# Patient Record
Sex: FEMALE | Race: ASIAN | NOT HISPANIC OR LATINO
[De-identification: names, ages, dates, MRNs, and addresses within clinical notes are randomized per-mention and may not be internally consistent; named-entity substitution may affect disease eponyms.]

---

## 2020-04-04 ENCOUNTER — APPOINTMENT (OUTPATIENT)
Dept: DISASTER EMERGENCY | Facility: CLINIC | Age: 35
End: 2020-04-04

## 2020-04-26 ENCOUNTER — MESSAGE (OUTPATIENT)
Age: 35
End: 2020-04-26

## 2020-05-06 LAB
SARS-COV-2 IGG SERPL IA-ACNC: <0.1 INDEX
SARS-COV-2 IGG SERPL QL IA: NEGATIVE

## 2020-05-12 PROBLEM — Z00.00 ENCOUNTER FOR PREVENTIVE HEALTH EXAMINATION: Status: ACTIVE | Noted: 2020-05-12

## 2020-09-10 ENCOUNTER — TRANSCRIPTION ENCOUNTER (OUTPATIENT)
Age: 35
End: 2020-09-10

## 2020-10-27 ENCOUNTER — RESULT REVIEW (OUTPATIENT)
Age: 35
End: 2020-10-27

## 2020-12-31 ENCOUNTER — ASOB RESULT (OUTPATIENT)
Age: 35
End: 2020-12-31

## 2020-12-31 ENCOUNTER — APPOINTMENT (OUTPATIENT)
Dept: ANTEPARTUM | Facility: CLINIC | Age: 35
End: 2020-12-31
Payer: COMMERCIAL

## 2020-12-31 PROCEDURE — 99072 ADDL SUPL MATRL&STAF TM PHE: CPT

## 2020-12-31 PROCEDURE — 76801 OB US < 14 WKS SINGLE FETUS: CPT

## 2021-02-10 ENCOUNTER — ASOB RESULT (OUTPATIENT)
Age: 36
End: 2021-02-10

## 2021-02-10 ENCOUNTER — APPOINTMENT (OUTPATIENT)
Dept: ANTEPARTUM | Facility: CLINIC | Age: 36
End: 2021-02-10
Payer: COMMERCIAL

## 2021-02-10 PROCEDURE — 99214 OFFICE O/P EST MOD 30 MIN: CPT | Mod: 25

## 2021-02-10 PROCEDURE — 76817 TRANSVAGINAL US OBSTETRIC: CPT

## 2021-02-10 PROCEDURE — 99072 ADDL SUPL MATRL&STAF TM PHE: CPT

## 2021-02-10 PROCEDURE — 76811 OB US DETAILED SNGL FETUS: CPT

## 2021-02-11 ENCOUNTER — APPOINTMENT (OUTPATIENT)
Dept: OBGYN | Facility: CLINIC | Age: 36
End: 2021-02-11
Payer: COMMERCIAL

## 2021-02-11 PROCEDURE — 76805 OB US >/= 14 WKS SNGL FETUS: CPT

## 2021-02-11 PROCEDURE — 59000 AMNIOCENTESIS DIAGNOSTIC: CPT

## 2021-02-11 PROCEDURE — 99072 ADDL SUPL MATRL&STAF TM PHE: CPT

## 2021-02-11 PROCEDURE — 76946 ECHO GUIDE FOR AMNIOCENTESIS: CPT

## 2021-02-16 ENCOUNTER — APPOINTMENT (OUTPATIENT)
Dept: MATERNAL FETAL MEDICINE | Facility: CLINIC | Age: 36
End: 2021-02-16

## 2021-04-21 ENCOUNTER — APPOINTMENT (OUTPATIENT)
Dept: ANTEPARTUM | Facility: CLINIC | Age: 36
End: 2021-04-21

## 2021-05-01 ENCOUNTER — OUTPATIENT (OUTPATIENT)
Dept: INPATIENT UNIT | Facility: HOSPITAL | Age: 36
LOS: 1 days | Discharge: HOME | End: 2021-05-01
Payer: COMMERCIAL

## 2021-05-01 VITALS — DIASTOLIC BLOOD PRESSURE: 65 MMHG | SYSTOLIC BLOOD PRESSURE: 105 MMHG | HEART RATE: 94 BPM

## 2021-05-01 VITALS
SYSTOLIC BLOOD PRESSURE: 116 MMHG | DIASTOLIC BLOOD PRESSURE: 70 MMHG | TEMPERATURE: 98 F | HEART RATE: 80 BPM | RESPIRATION RATE: 16 BRPM

## 2021-05-01 DIAGNOSIS — O26.899 OTHER SPECIFIED PREGNANCY RELATED CONDITIONS, UNSPECIFIED TRIMESTER: ICD-10-CM

## 2021-05-01 DIAGNOSIS — Z3A.00 WEEKS OF GESTATION OF PREGNANCY NOT SPECIFIED: ICD-10-CM

## 2021-05-01 LAB
APPEARANCE UR: CLEAR — SIGNIFICANT CHANGE UP
BACTERIA # UR AUTO: ABNORMAL
BILIRUB UR-MCNC: NEGATIVE — SIGNIFICANT CHANGE UP
COLOR SPEC: SIGNIFICANT CHANGE UP
DIFF PNL FLD: ABNORMAL
EPI CELLS # UR: SIGNIFICANT CHANGE UP
GLUCOSE UR QL: NEGATIVE — SIGNIFICANT CHANGE UP
KETONES UR-MCNC: ABNORMAL
LEUKOCYTE ESTERASE UR-ACNC: NEGATIVE — SIGNIFICANT CHANGE UP
NITRITE UR-MCNC: NEGATIVE — SIGNIFICANT CHANGE UP
PH UR: 7.5 — SIGNIFICANT CHANGE UP (ref 5–8)
PROT UR-MCNC: NEGATIVE — SIGNIFICANT CHANGE UP
RBC CASTS # UR COMP ASSIST: 0 /HPF — SIGNIFICANT CHANGE UP (ref 0–4)
SP GR SPEC: 1.01 — LOW (ref 1.01–1.02)
UROBILINOGEN FLD QL: SIGNIFICANT CHANGE UP
WBC UR QL: SIGNIFICANT CHANGE UP /HPF (ref 0–5)

## 2021-05-01 PROCEDURE — 76818 FETAL BIOPHYS PROFILE W/NST: CPT | Mod: 26

## 2021-05-01 PROCEDURE — 99214 OFFICE O/P EST MOD 30 MIN: CPT | Mod: 25

## 2021-05-01 PROCEDURE — 76817 TRANSVAGINAL US OBSTETRIC: CPT | Mod: 26

## 2021-05-01 NOTE — OB PROVIDER TRIAGE NOTE - NSOBPROVIDERNOTE_OBGYN_ALL_OB_FT
a/p: 35 y.o.  ANISHA 2021 @ 2021 @ 31.1 weeks     no evidence of acute process    recommendation per Dr Rivera: f/u with prenatal office on Monday 5/3/2021, pelvic rest  discussed with Dr Jim. Pt discharged home with  labor precautions, fetal kick counts reviewed. Pt instructed on strict pelvic rest.     Vannessa, NP

## 2021-05-01 NOTE — OB PROVIDER TRIAGE NOTE - NSHPPHYSICALEXAM_GEN_ALL_CORE
abdomen soft, nontender  taus: slup, cephalic presentation, posterior placenta, bpp 8/8, vinicio 16.7  sse: no active bleeding noted, 5 mL bright red blood cleared from vaginal vault, abdomen soft, nontender  taus: slup, cephalic presentation, posterior placenta, bpp 8/8, vinicio 16.7  sse: no active bleeding noted, 5 mL bright red blood cleared from vaginal vault, tissue noted to right posterior 8 o'clock on cervix, Dr Nelson called to bedside. Dr Rivera at bedside for speculum and repeat transvaginal exam: most likely cervical polyp per Dr Rivera  tvus: cervical length 3.5, no dynamic changes/funneling noted, no placenta previa noted  nst reactive   toco: ctx q 2-3 mins

## 2021-05-01 NOTE — OB PROVIDER TRIAGE NOTE - HISTORY OF PRESENT ILLNESS
35 y.o.  ANISHA 2021 @ 2021 @ 31.1 weeks presents from scheduled prenatal office for evaluation of contractions on nst and pt c/o vaginal bleeding. Pt denies ctx, LOF, current bleeding. States +FM. Antepartum course complicated by low lying placenta, large cervical fibroid.     allergies:  NKDA  medications:  prenatal vitamins    med/surg hx:  denies    OBGYN hx:  fibroid uterus:    TERESA suberosal  7.9 x 8.1 x 6.9  anterior subserosal 2.2 x 2.3 x 1.1  right lateral 7.8 x 6.2 x 5.5  posterior 4.1 x 4.0 3.0 35 y.o.  ANISHA 2021 @ 2021 @ 31.1 weeks presents from scheduled prenatal office for evaluation of contractions on nst and pt c/o vaginal bleeding. Pt denies ctx, LOF, current bleeding. States +FM. Antepartum course complicated by low lying placenta, large cervical fibroid.     Blood type: O positive  allergies:  NKDA  medications:  prenatal vitamins    med/surg hx:  denies    OBGYN hx:  fibroid uterus:    TERESA suberosal  7.9 x 8.1 x 6.9  anterior subserosal 2.2 x 2.3 x 1.1  right lateral 7.8 x 6.2 x 5.5  posterior 4.1 x 4.0 3.0 35 y.o.  ANISHA 2021 @ 31.1 weeks presents from scheduled prenatal office for evaluation of contractions on nst and pt c/o vaginal bleeding. Pt denies ctx, LOF, current bleeding. States +FM. Antepartum course complicated by low lying placenta, large cervical fibroid.     Blood type: O positive  allergies:  NKDA  medications:  prenatal vitamins    med/surg hx:  denies    OBGYN hx:  fibroid uterus:    TERESA suberosal  7.9 x 8.1 x 6.9  anterior subserosal 2.2 x 2.3 x 1.1  right lateral 7.8 x 6.2 x 5.5  posterior 4.1 x 4.0 3.0

## 2021-05-01 NOTE — OB PROVIDER TRIAGE NOTE - NSHPLABSRESULTS_GEN_ALL_CORE
Urinalysis Basic - ( 01 May 2021 16:36 )    Color: Light Yellow / Appearance: Clear / S.007 / pH: x  Gluc: x / Ketone: Moderate  / Bili: Negative / Urobili: <2 mg/dL   Blood: x / Protein: Negative / Nitrite: Negative   Leuk Esterase: Negative / RBC: 0 /HPF / WBC 2-5 /HPF   Sq Epi: x / Non Sq Epi: Few / Bacteria: Occasional

## 2021-05-01 NOTE — OB PROVIDER TRIAGE NOTE - ADDITIONAL INSTRUCTIONS
Please drink 1-2 liters of fluid per day. Please make a follow up appointment in your OB office for 5/3/2021. Please follow strict pelvic rest instructions and no heavy lifting or strenuous activity.

## 2021-05-01 NOTE — OB PROVIDER TRIAGE NOTE - NS_OBGYNHISTORY_OBGYN_ALL_OB_FT
large fibroid 6.4 x5.5   total of 3 fibroids ARLEN hx:  fibroid uterus:    TERESA suberosal  7.9 x 8.1 x 6.9  anterior subserosal 2.2 x 2.3 x 1.1  right lateral 7.8 x 6.2 x 5.5  posterior 4.1 x 4.0 3.0

## 2021-05-12 ENCOUNTER — APPOINTMENT (OUTPATIENT)
Dept: ANTEPARTUM | Facility: CLINIC | Age: 36
End: 2021-05-12
Payer: COMMERCIAL

## 2021-05-12 ENCOUNTER — NON-APPOINTMENT (OUTPATIENT)
Age: 36
End: 2021-05-12

## 2021-05-12 PROCEDURE — 76816 OB US FOLLOW-UP PER FETUS: CPT

## 2021-05-12 PROCEDURE — 76819 FETAL BIOPHYS PROFIL W/O NST: CPT

## 2021-05-12 PROCEDURE — 99072 ADDL SUPL MATRL&STAF TM PHE: CPT

## 2021-05-16 ENCOUNTER — INPATIENT (INPATIENT)
Facility: HOSPITAL | Age: 36
LOS: 6 days | Discharge: HOME | End: 2021-05-23
Attending: OBSTETRICS & GYNECOLOGY | Admitting: OBSTETRICS & GYNECOLOGY

## 2021-05-16 VITALS
TEMPERATURE: 99 F | RESPIRATION RATE: 18 BRPM | HEART RATE: 92 BPM | SYSTOLIC BLOOD PRESSURE: 121 MMHG | DIASTOLIC BLOOD PRESSURE: 79 MMHG

## 2021-05-16 DIAGNOSIS — O26.899 OTHER SPECIFIED PREGNANCY RELATED CONDITIONS, UNSPECIFIED TRIMESTER: ICD-10-CM

## 2021-05-16 DIAGNOSIS — Z3A.00 WEEKS OF GESTATION OF PREGNANCY NOT SPECIFIED: ICD-10-CM

## 2021-05-16 DIAGNOSIS — O42.90 PREMATURE RUPTURE OF MEMBRANES, UNSPECIFIED AS TO LENGTH OF TIME BETWEEN RUPTURE AND ONSET OF LABOR, UNSPECIFIED WEEKS OF GESTATION: ICD-10-CM

## 2021-05-16 LAB
APPEARANCE UR: ABNORMAL
BACTERIA # UR AUTO: ABNORMAL
BASOPHILS # BLD AUTO: 0.06 K/UL — SIGNIFICANT CHANGE UP (ref 0–0.2)
BASOPHILS NFR BLD AUTO: 0.6 % — SIGNIFICANT CHANGE UP (ref 0–2)
BILIRUB UR-MCNC: NEGATIVE — SIGNIFICANT CHANGE UP
BLD GP AB SCN SERPL QL: NEGATIVE — SIGNIFICANT CHANGE UP
COLOR SPEC: COLORLESS — SIGNIFICANT CHANGE UP
COVID-19 SPIKE DOMAIN AB INTERP: POSITIVE
COVID-19 SPIKE DOMAIN ANTIBODY RESULT: 32.8 U/ML — HIGH
DIFF PNL FLD: ABNORMAL
EOSINOPHIL # BLD AUTO: 0.08 K/UL — SIGNIFICANT CHANGE UP (ref 0–0.5)
EOSINOPHIL NFR BLD AUTO: 0.8 % — SIGNIFICANT CHANGE UP (ref 0–6)
EPI CELLS # UR: ABNORMAL
GLUCOSE UR QL: NEGATIVE — SIGNIFICANT CHANGE UP
HCT VFR BLD CALC: 37.2 % — SIGNIFICANT CHANGE UP (ref 34.5–45)
HGB BLD-MCNC: 12 G/DL — SIGNIFICANT CHANGE UP (ref 11.5–15.5)
IANC: 7.46 K/UL — SIGNIFICANT CHANGE UP (ref 1.5–8.5)
IMM GRANULOCYTES NFR BLD AUTO: 1.6 % — HIGH (ref 0–1.5)
KETONES UR-MCNC: NEGATIVE — SIGNIFICANT CHANGE UP
LEUKOCYTE ESTERASE UR-ACNC: ABNORMAL
LYMPHOCYTES # BLD AUTO: 1.18 K/UL — SIGNIFICANT CHANGE UP (ref 1–3.3)
LYMPHOCYTES # BLD AUTO: 12.3 % — LOW (ref 13–44)
MCHC RBC-ENTMCNC: 27.9 PG — SIGNIFICANT CHANGE UP (ref 27–34)
MCHC RBC-ENTMCNC: 32.3 GM/DL — SIGNIFICANT CHANGE UP (ref 32–36)
MCV RBC AUTO: 86.5 FL — SIGNIFICANT CHANGE UP (ref 80–100)
MONOCYTES # BLD AUTO: 0.64 K/UL — SIGNIFICANT CHANGE UP (ref 0–0.9)
MONOCYTES NFR BLD AUTO: 6.7 % — SIGNIFICANT CHANGE UP (ref 2–14)
NEUTROPHILS # BLD AUTO: 7.46 K/UL — HIGH (ref 1.8–7.4)
NEUTROPHILS NFR BLD AUTO: 78 % — HIGH (ref 43–77)
NITRITE UR-MCNC: NEGATIVE — SIGNIFICANT CHANGE UP
NRBC # BLD: 0 /100 WBCS — SIGNIFICANT CHANGE UP
NRBC # FLD: 0 K/UL — SIGNIFICANT CHANGE UP
PH UR: 7 — SIGNIFICANT CHANGE UP (ref 5–8)
PLATELET # BLD AUTO: 177 K/UL — SIGNIFICANT CHANGE UP (ref 150–400)
PROT UR-MCNC: ABNORMAL
RBC # BLD: 4.3 M/UL — SIGNIFICANT CHANGE UP (ref 3.8–5.2)
RBC # FLD: 13.2 % — SIGNIFICANT CHANGE UP (ref 10.3–14.5)
RH IG SCN BLD-IMP: POSITIVE — SIGNIFICANT CHANGE UP
RH IG SCN BLD-IMP: POSITIVE — SIGNIFICANT CHANGE UP
SARS-COV-2 IGG+IGM SERPL QL IA: 32.8 U/ML — HIGH
SARS-COV-2 IGG+IGM SERPL QL IA: POSITIVE
SARS-COV-2 RNA SPEC QL NAA+PROBE: SIGNIFICANT CHANGE UP
SP GR SPEC: 1 — LOW (ref 1.01–1.02)
UROBILINOGEN FLD QL: SIGNIFICANT CHANGE UP
WBC # BLD: 9.57 K/UL — SIGNIFICANT CHANGE UP (ref 3.8–10.5)
WBC # FLD AUTO: 9.57 K/UL — SIGNIFICANT CHANGE UP (ref 3.8–10.5)
WBC UR QL: SIGNIFICANT CHANGE UP /HPF (ref 0–5)

## 2021-05-16 RX ORDER — SODIUM CHLORIDE 9 MG/ML
1000 INJECTION, SOLUTION INTRAVENOUS
Refills: 0 | Status: DISCONTINUED | OUTPATIENT
Start: 2021-05-16 | End: 2021-05-16

## 2021-05-16 RX ORDER — VANCOMYCIN HCL 1 G
1000 VIAL (EA) INTRAVENOUS EVERY 8 HOURS
Refills: 0 | Status: DISCONTINUED | OUTPATIENT
Start: 2021-05-16 | End: 2021-05-16

## 2021-05-16 RX ORDER — VANCOMYCIN HCL 1 G
VIAL (EA) INTRAVENOUS
Refills: 0 | Status: DISCONTINUED | OUTPATIENT
Start: 2021-05-16 | End: 2021-05-16

## 2021-05-16 RX ORDER — OXYTOCIN 10 UNIT/ML
333.33 VIAL (ML) INJECTION
Qty: 20 | Refills: 0 | Status: DISCONTINUED | OUTPATIENT
Start: 2021-05-16 | End: 2021-05-16

## 2021-05-16 RX ORDER — GENTAMICIN SULFATE 40 MG/ML
260 VIAL (ML) INJECTION EVERY 24 HOURS
Refills: 0 | Status: DISCONTINUED | OUTPATIENT
Start: 2021-05-17 | End: 2021-05-17

## 2021-05-16 RX ORDER — HEPARIN SODIUM 5000 [USP'U]/ML
5000 INJECTION INTRAVENOUS; SUBCUTANEOUS EVERY 12 HOURS
Refills: 0 | Status: DISCONTINUED | OUTPATIENT
Start: 2021-05-16 | End: 2021-05-20

## 2021-05-16 RX ORDER — AZITHROMYCIN 500 MG/1
1000 TABLET, FILM COATED ORAL ONCE
Refills: 0 | Status: COMPLETED | OUTPATIENT
Start: 2021-05-16 | End: 2021-05-16

## 2021-05-16 RX ORDER — MAGNESIUM SULFATE 500 MG/ML
2 VIAL (ML) INJECTION
Qty: 40 | Refills: 0 | Status: DISCONTINUED | OUTPATIENT
Start: 2021-05-16 | End: 2021-05-16

## 2021-05-16 RX ORDER — VANCOMYCIN HCL 1 G
1000 VIAL (EA) INTRAVENOUS ONCE
Refills: 0 | Status: COMPLETED | OUTPATIENT
Start: 2021-05-16 | End: 2021-05-16

## 2021-05-16 RX ORDER — MAGNESIUM SULFATE 500 MG/ML
4 VIAL (ML) INJECTION ONCE
Refills: 0 | Status: COMPLETED | OUTPATIENT
Start: 2021-05-16 | End: 2021-05-16

## 2021-05-16 RX ORDER — GENTAMICIN SULFATE 40 MG/ML
260 VIAL (ML) INJECTION EVERY 24 HOURS
Refills: 0 | Status: DISCONTINUED | OUTPATIENT
Start: 2021-05-16 | End: 2021-05-16

## 2021-05-16 RX ORDER — SODIUM CHLORIDE 9 MG/ML
500 INJECTION, SOLUTION INTRAVENOUS ONCE
Refills: 0 | Status: COMPLETED | OUTPATIENT
Start: 2021-05-16 | End: 2021-05-16

## 2021-05-16 RX ADMIN — SODIUM CHLORIDE 1500 MILLILITER(S): 9 INJECTION, SOLUTION INTRAVENOUS at 11:50

## 2021-05-16 RX ADMIN — AZITHROMYCIN 1000 MILLIGRAM(S): 500 TABLET, FILM COATED ORAL at 14:31

## 2021-05-16 RX ADMIN — Medication 250 MILLIGRAM(S): at 15:14

## 2021-05-16 RX ADMIN — HEPARIN SODIUM 5000 UNIT(S): 5000 INJECTION INTRAVENOUS; SUBCUTANEOUS at 18:10

## 2021-05-16 RX ADMIN — Medication 100 MILLIGRAM(S): at 14:31

## 2021-05-16 RX ADMIN — Medication 300 GRAM(S): at 12:16

## 2021-05-16 RX ADMIN — Medication 12 MILLIGRAM(S): at 12:17

## 2021-05-16 RX ADMIN — Medication 50 GM/HR: at 12:37

## 2021-05-16 RX ADMIN — Medication 250 MILLIGRAM(S): at 13:13

## 2021-05-16 RX ADMIN — Medication 100 MILLIGRAM(S): at 23:38

## 2021-05-16 RX ADMIN — Medication 1 TABLET(S): at 18:10

## 2021-05-16 RX ADMIN — SODIUM CHLORIDE 125 MILLILITER(S): 9 INJECTION, SOLUTION INTRAVENOUS at 12:17

## 2021-05-16 NOTE — OB PROVIDER TRIAGE NOTE - NSOBPROVIDERNOTE_OBGYN_ALL_OB_FT
a/p: 35 y.o.  ANISHA 2021 @ 33.2 weeks PPROM    GBS culture collected and pending  covid 19 swab collected and pending, patient  covid 19 swab collected and pending  Discussed with NICU fellow, NICU fellow to consult at bedside  Discussed with Dr Jim, plan for betamethasone course, magnesium sulfate, vancomycin    Vannessa, NP

## 2021-05-16 NOTE — OB PROVIDER TRIAGE NOTE - NSHPPHYSICALEXAM_GEN_ALL_CORE
abdomen soft, nontender  taus: sliup, cephalic presentation, anterior placenta, bpp 6/8, vinicio 3, large TERESA fibroid non obstructing noted  sse: +pooling/nitrazine/ferning  sve: 1/0/-3/gross LOF, clear  nst in progress

## 2021-05-16 NOTE — OB PROVIDER H&P - HISTORY OF PRESENT ILLNESS
35 y.o.  ANISHA 2021 @ 33.2 weeks presents with c/o LOF @ 830am. Pt denies LOF, VB. Antepartum course complicated by low lying placenta, large TERESA 8cm fibroid.     Blood type: O positive  allergies:  PCN - unknown reaction  medications:  prenatal vitamins    med/surg hx:  denies    OBGYN hx:   TOP x 1  fibroid uterus:    TERESA suberosal  7.9 x 8.1 x 6.9  anterior subserosal 2.2 x 2.3 x 1.1  right lateral 7.8 x 6.2 x 5.5  posterior 4.1 x 4.0 3.0

## 2021-05-16 NOTE — OB PROVIDER TRIAGE NOTE - NS_OBGYNHISTORY_OBGYN_ALL_OB_FT
ARLEN hx:  2010 TOP x 1  fibroid uterus:    TERESA suberosal  7.9 x 8.1 x 6.9  anterior subserosal 2.2 x 2.3 x 1.1  right lateral 7.8 x 6.2 x 5.5  posterior 4.1 x 4.0 3.0 ARLEN hx:  2010 TOP x 1  fibroid uterus:    TERESA subserosal  7.9 x 8.1 x 6.9  anterior subserosal 2.2 x 2.3 x 1.1  right lateral 7.8 x 6.2 x 5.5  posterior 4.1 x 4.0 3.0

## 2021-05-16 NOTE — OB PROVIDER H&P - NS_OBGYNHISTORY_OBGYN_ALL_OB_FT
2010 TOP x 1  fibroid uterus:    TERESA suberosal  7.9 x 8.1 x 6.9  anterior subserosal 2.2 x 2.3 x 1.1  right lateral 7.8 x 6.2 x 5.5  posterior 4.1 x 4.0 3.0

## 2021-05-16 NOTE — OB RN TRIAGE NOTE - LIVING CHILDREN, OB PROFILE
Pt and sister were on a regional flight from [place] to [place], pt reports having bilateral flank pain, worse to R side, with nausea and episodes of diarrhea  States swent to restroom and had a syncopal episode while leaning on the toilet and then a second syncopal episode.  RN on the plane started a 22g PIV to L hand and administered IV fluids.  Plane diverted to [place] and pt transported here via  EMS.   
0

## 2021-05-16 NOTE — CONSULT NOTE PEDS - SUBJECTIVE AND OBJECTIVE BOX
Ms. Barton is a 34 y/o  at 33w2d gestational age admitted for PPROM. Maternal history notable for fibroids, and prenatal course notable for low-lying placenta and choroid plexus cyst on US.  Most recent EFW 1928g.  Plan for delivery by 34 weeks or sooner if indicated. NICU consulted to discuss what to expect if she were to deliver at 33-34 weeks gestation. I met with Ms. Barton and her partner and we reviewed the following:    The NICU team will be present at her delivery and will immediately assess and care for her infant.  The infant may require respiratory support, most commonly in the form of nasal CPAP. While unlikely, there is a small possibility that the infant would require intubation and mechanical ventilation. Outcomes improve following steroid administration.  Depending on the clinical status of the infant, enteral feedings may or may not be started immediately. The infant will receive IVF/IV nutrition as necessary. Due to immature suck/swallow, orogastric or nasogastric tube may be required once feeds are initiated.  The infant is also at risk for hypoglycemia due to prematurity.  Discussed the benefits of breastfeeding in  infants and encouraged mother to pump following delivery.  The infant will be at risk for jaundice which can be treated with phototherapy.  The infant will be screened for infection and treated with antibiotics if deemed clinically necessary.  The infant is at risk for thermoregulation issues.  Length of stay is highly variable, but at this gestational age, the average length of stay is 10-14 days, with a minimum of 5 days. Reviewed discharge criteria.    Ms. Barton and her partner had the opportunity to ask questions and may contact the NICU at any time if further questions arise.  Thank you for the opportunity to participate in the care of this patient and please inform us of any changes in her status.

## 2021-05-16 NOTE — OB PROVIDER TRIAGE NOTE - HISTORY OF PRESENT ILLNESS
35 y.o.  ANISHA 2021 @ 33.2 weeks presents with c/o LOF @ 830am. Pt denies LOF, VB. Antepartum course complicated by low lying placenta, large cervical fibroid.     Blood type: O positive  allergies:  NKDA  medications:  prenatal vitamins    med/surg hx:  denies    OBGYN hx:   TOP x 1  fibroid uterus:    TERESA suberosal  7.9 x 8.1 x 6.9  anterior subserosal 2.2 x 2.3 x 1.1  right lateral 7.8 x 6.2 x 5.5  posterior 4.1 x 4.0 3.0 35 y.o.  ANISHA 2021 @ 33.2 weeks presents with c/o LOF @ 830am. Pt denies LOF, VB. Antepartum course complicated by low lying placenta, large TERESA 8cm fibroid.     Blood type: O positive  allergies:  PCN - unknown reaction  medications:  prenatal vitamins    med/surg hx:  denies    OBGYN hx:   TOP x 1  fibroid uterus:    TERESA suberosal  7.9 x 8.1 x 6.9  anterior subserosal 2.2 x 2.3 x 1.1  right lateral 7.8 x 6.2 x 5.5  posterior 4.1 x 4.0 3.0

## 2021-05-17 ENCOUNTER — ASOB RESULT (OUTPATIENT)
Age: 36
End: 2021-05-17

## 2021-05-17 ENCOUNTER — APPOINTMENT (OUTPATIENT)
Dept: ANTEPARTUM | Facility: HOSPITAL | Age: 36
End: 2021-05-17

## 2021-05-17 PROBLEM — Z33.2 ENCOUNTER FOR ELECTIVE TERMINATION OF PREGNANCY: Chronic | Status: ACTIVE | Noted: 2021-05-16

## 2021-05-17 LAB
CULTURE RESULTS: SIGNIFICANT CHANGE UP
SPECIMEN SOURCE: SIGNIFICANT CHANGE UP
T PALLIDUM AB TITR SER: NEGATIVE — SIGNIFICANT CHANGE UP

## 2021-05-17 RX ORDER — GENTAMICIN SULFATE 40 MG/ML
260 VIAL (ML) INJECTION EVERY 24 HOURS
Refills: 0 | Status: COMPLETED | OUTPATIENT
Start: 2021-05-17 | End: 2021-05-17

## 2021-05-17 RX ADMIN — Medication 250 MILLIGRAM(S): at 18:22

## 2021-05-17 RX ADMIN — Medication 100 MILLIGRAM(S): at 06:30

## 2021-05-17 RX ADMIN — Medication 100 MILLIGRAM(S): at 21:25

## 2021-05-17 RX ADMIN — Medication 1 TABLET(S): at 14:14

## 2021-05-17 RX ADMIN — Medication 100 MILLIGRAM(S): at 14:13

## 2021-05-17 RX ADMIN — HEPARIN SODIUM 5000 UNIT(S): 5000 INJECTION INTRAVENOUS; SUBCUTANEOUS at 06:30

## 2021-05-17 RX ADMIN — Medication 12 MILLIGRAM(S): at 14:13

## 2021-05-17 RX ADMIN — HEPARIN SODIUM 5000 UNIT(S): 5000 INJECTION INTRAVENOUS; SUBCUTANEOUS at 18:03

## 2021-05-18 RX ADMIN — Medication 100 MILLIGRAM(S): at 13:38

## 2021-05-18 RX ADMIN — HEPARIN SODIUM 5000 UNIT(S): 5000 INJECTION INTRAVENOUS; SUBCUTANEOUS at 23:24

## 2021-05-18 RX ADMIN — HEPARIN SODIUM 5000 UNIT(S): 5000 INJECTION INTRAVENOUS; SUBCUTANEOUS at 06:53

## 2021-05-18 RX ADMIN — Medication 300 MILLIGRAM(S): at 23:53

## 2021-05-18 RX ADMIN — Medication 1 TABLET(S): at 13:38

## 2021-05-18 RX ADMIN — Medication 100 MILLIGRAM(S): at 06:52

## 2021-05-19 LAB
BLD GP AB SCN SERPL QL: NEGATIVE — SIGNIFICANT CHANGE UP
CULTURE RESULTS: SIGNIFICANT CHANGE UP
RH IG SCN BLD-IMP: POSITIVE — SIGNIFICANT CHANGE UP
SPECIMEN SOURCE: SIGNIFICANT CHANGE UP

## 2021-05-19 RX ADMIN — HEPARIN SODIUM 5000 UNIT(S): 5000 INJECTION INTRAVENOUS; SUBCUTANEOUS at 22:55

## 2021-05-19 RX ADMIN — Medication 300 MILLIGRAM(S): at 16:21

## 2021-05-19 RX ADMIN — HEPARIN SODIUM 5000 UNIT(S): 5000 INJECTION INTRAVENOUS; SUBCUTANEOUS at 09:11

## 2021-05-19 RX ADMIN — Medication 1 TABLET(S): at 12:05

## 2021-05-19 RX ADMIN — Medication 300 MILLIGRAM(S): at 22:55

## 2021-05-19 RX ADMIN — Medication 300 MILLIGRAM(S): at 09:11

## 2021-05-20 ENCOUNTER — ASOB RESULT (OUTPATIENT)
Age: 36
End: 2021-05-20

## 2021-05-20 ENCOUNTER — APPOINTMENT (OUTPATIENT)
Dept: ANTEPARTUM | Facility: CLINIC | Age: 36
End: 2021-05-20

## 2021-05-20 RX ORDER — HEPARIN SODIUM 5000 [USP'U]/ML
5000 INJECTION INTRAVENOUS; SUBCUTANEOUS EVERY 12 HOURS
Refills: 0 | Status: COMPLETED | OUTPATIENT
Start: 2021-05-20 | End: 2021-05-20

## 2021-05-20 RX ADMIN — HEPARIN SODIUM 5000 UNIT(S): 5000 INJECTION INTRAVENOUS; SUBCUTANEOUS at 09:53

## 2021-05-20 RX ADMIN — HEPARIN SODIUM 5000 UNIT(S): 5000 INJECTION INTRAVENOUS; SUBCUTANEOUS at 23:12

## 2021-05-20 RX ADMIN — Medication 300 MILLIGRAM(S): at 23:12

## 2021-05-20 RX ADMIN — Medication 1 TABLET(S): at 14:34

## 2021-05-20 RX ADMIN — Medication 300 MILLIGRAM(S): at 14:34

## 2021-05-20 RX ADMIN — Medication 300 MILLIGRAM(S): at 05:47

## 2021-05-20 NOTE — PROGRESS NOTE ADULT - ATTENDING COMMENTS
36 y/o P0 at 33.5 wks admitted with PROM. Patient is s/p BMZ and currently receiving Latency Abx. Patient reports feeling well. She denies any vaginal bleeding, contractions, or decreased fetal movement.   - Appreciate MFM consult  - Patient to be scheduled for IOL at 34 weeks  - Discussed using PO Cytotec and possible Pitocin. Reviewed different forms of pain management.   - All questions and concerns were addressed to the patient's satisfaction
Patient seen at bedside. Agree with above note.
PPROM   Continue antibiotics   inpatient management
Patient seen at bedside   Agree with A& P above

## 2021-05-20 NOTE — PROGRESS NOTE ADULT - ASSESSMENT
A/P: 36yo  ANISHA 2021 @ 33w3d admitted with PPROM . No s/s infection at this time    #PPROM  - Gent (-) and clindamycin (-), s/p azithro  for latency  - WBC 9.57  - no s/s infection at this time  - plan for delivery @ 34w; fibroids however cleared for VD by Dr. Doherty    #FWB  - BMZ (-) for FLM  - c/w latency abx  - ATU sono today   - f/u GBS ()    #MWB  - HSQ for VTE ppx  - COVID neg  - maintain active T+S  - 2u PRBCs on hold for fibroids  - Reg diet    H Irwin PGY3
A/P: 36yo  ANISHA 2021 @ 33w4d admitted with PPROM . No s/s infection at this time    #PPROM  - clindamycin (-), s/p azithro (), and gentamicin (-) for latency  - WBC 9.57  - no s/s infection at this time  - plan for delivery @ 34w; fibroids however cleared for VD by Dr. Doherty  - NST BID x2h    #FWB  - BMZ (-) for FLM  - c/w latency abx  - ATU sono (): vtx, post placenta, 224, 21%, BPP 6/8 2/2 MVP 1.1  - f/u GBS ()    #MWB  - HSQ for VTE ppx  - COVID neg  - maintain active T+S  - 2u PRBCs on hold for fibroids  - Reg diet    H Irwin PGY3
A/P: 36yo  ANISHA 2021 @ 33w5d admitted with PPROM . No s/s infection at this time    #PPROM  - clindamycin (-), s/p azithro (), and gentamicin (-) for latency  - WBC 9.57  - no s/s infection at this time  - plan for delivery @ 34w; cleared for VD by Dr. Doherty despite fibroids -> this   - NST BID x2h    #FWB  - BMZ (-) for FLM  - c/w latency abx  - ATU sono (): vtx, post placenta, 224, 21%, BPP 6/8 2/2 MVP 1.1  - f/u GBS ()    #MWB  - HSQ for VTE ppx  - COVID neg  - maintain active T+S  - 2u PRBCs on hold for fibroids  - Reg diet    H Dc PGY3
A/P: 34yo  ANISHA 2021 @ 33w6d admitted with PPROM . No s/s infection at this time    #PPROM  - clindamycin (-), s/p azithro (), and gentamicin (-) for latency  - WBC 9.57  - no s/s infection at this time  - plan for delivery @ 34w; cleared for VD by Dr. Doherty despite fibroids -> tomorrow  @ 10a  - NST BID x2h    #FWB  - BMZ (-) for FLM  - c/w latency abx  - ATU sono (): vtx, post placenta, 224, 21%, BPP 6/8 2/2 MVP 1.1  - f/u GBS ()    #MWB  - HSQ for VTE ppx  - COVID neg  - maintain active T+S  - 2u PRBCs on hold for fibroids  - Reg diet    H Dc PGY3

## 2021-05-21 ENCOUNTER — TRANSCRIPTION ENCOUNTER (OUTPATIENT)
Age: 36
End: 2021-05-21

## 2021-05-21 LAB
BLD GP AB SCN SERPL QL: NEGATIVE — SIGNIFICANT CHANGE UP
HCT VFR BLD CALC: 37.6 % — SIGNIFICANT CHANGE UP (ref 34.5–45)
HGB BLD-MCNC: 12.2 G/DL — SIGNIFICANT CHANGE UP (ref 11.5–15.5)
MCHC RBC-ENTMCNC: 27.6 PG — SIGNIFICANT CHANGE UP (ref 27–34)
MCHC RBC-ENTMCNC: 32.4 GM/DL — SIGNIFICANT CHANGE UP (ref 32–36)
MCV RBC AUTO: 85.1 FL — SIGNIFICANT CHANGE UP (ref 80–100)
NRBC # BLD: 0 /100 WBCS — SIGNIFICANT CHANGE UP
NRBC # FLD: 0 K/UL — SIGNIFICANT CHANGE UP
PLATELET # BLD AUTO: 215 K/UL — SIGNIFICANT CHANGE UP (ref 150–400)
RBC # BLD: 4.42 M/UL — SIGNIFICANT CHANGE UP (ref 3.8–5.2)
RBC # FLD: 13.4 % — SIGNIFICANT CHANGE UP (ref 10.3–14.5)
RH IG SCN BLD-IMP: POSITIVE — SIGNIFICANT CHANGE UP
WBC # BLD: 14.7 K/UL — HIGH (ref 3.8–10.5)
WBC # FLD AUTO: 14.7 K/UL — HIGH (ref 3.8–10.5)

## 2021-05-21 RX ORDER — CITRIC ACID/SODIUM CITRATE 300-500 MG
15 SOLUTION, ORAL ORAL EVERY 6 HOURS
Refills: 0 | Status: DISCONTINUED | OUTPATIENT
Start: 2021-05-21 | End: 2021-05-22

## 2021-05-21 RX ORDER — SODIUM CHLORIDE 9 MG/ML
1000 INJECTION, SOLUTION INTRAVENOUS
Refills: 0 | Status: DISCONTINUED | OUTPATIENT
Start: 2021-05-21 | End: 2021-05-22

## 2021-05-21 RX ORDER — OXYTOCIN 10 UNIT/ML
333.33 VIAL (ML) INJECTION
Qty: 20 | Refills: 0 | Status: DISCONTINUED | OUTPATIENT
Start: 2021-05-21 | End: 2021-05-22

## 2021-05-21 RX ADMIN — Medication 300 MILLIGRAM(S): at 06:08

## 2021-05-21 NOTE — OB RN DELIVERY SUMMARY - NSSELHIDDEN_OBGYN_ALL_OB_FT
[[email or web address]_ALL_OB_FT:[id number]],[NS_DeliveryAssist1_OBGYN_ALL_OB_FT:[id number]],[NS_DeliveryRN_OBGYN_ALL_OB_FT:[id number]]

## 2021-05-21 NOTE — OB RN DELIVERY SUMMARY - NS_SEPSISRSKCALC_OBGYN_ALL_OB_FT
EOS calculated successfully. EOS Risk Factor: 0.5/1000 live births ([facility] [place] national incidence[place] GA=34w;Temp=98.24; JCV=575.367; GBS='Negative'; Antibiotics='No antibiotics or any antibiotics < 2 hrs prior to birth'

## 2021-05-21 NOTE — CHART NOTE - NSCHARTNOTEFT_GEN_A_CORE
Patient denies contractions, vaginal bleeding. Continues to have LOF. Endorses +FM. Pt for scheduled 10am IOL for PPROM at 34 weeks.     PE:  Vital Signs Last 24 Hrs  T(C): 37.1 (21 May 2021 05:47), Max: 37.1 (21 May 2021 05:36)  T(F): 98.8 (21 May 2021 05:47), Max: 98.8 (21 May 2021 05:36)  HR: 110 (21 May 2021 05:47) (86 - 115)  BP: 100/64 (21 May 2021 05:47) (100/64 - 111/70)  BP(mean): --  RR: 18 (21 May 2021 05:47) (17 - 18)  SpO2: 96% (21 May 2021 05:47) (94% - 99%)  NST: pending  VE:0.5/50/-3  Sono: cephalic presentation                            12.2   14.70 )-----------( 215      ( 21 May 2021 06:37 )             37.6         34 y/o P0 at 34 weeks admitted for PPROM s/p Azithro, clinda, gent; for IOL today GBS negative  - Tx to L&D   -PO cytotec  -EFM/toco  - 2 units PRBC on hold    d/w Dr Toan Lomeli FNP-BC                          Lactate Trend            CAPILLARY BLOOD GLUCOSE            Culture Results:   <10,000 CFU/mL Normal Urogenital Samira (05-16 @ 13:03)  Culture Results:   No Beta Strep group B isolated (05-16 @ 12:45) Patient denies contractions, vaginal bleeding. Continues to have LOF. Endorses +FM. Pt for scheduled 10am IOL for PPROM at 34 weeks.     PE:  Vital Signs Last 24 Hrs  T(C): 37.1 (21 May 2021 05:47), Max: 37.1 (21 May 2021 05:36)  T(F): 98.8 (21 May 2021 05:47), Max: 98.8 (21 May 2021 05:36)  HR: 110 (21 May 2021 05:47) (86 - 115)  BP: 100/64 (21 May 2021 05:47) (100/64 - 111/70)  BP(mean): --  RR: 18 (21 May 2021 05:47) (17 - 18)  SpO2: 96% (21 May 2021 05:47) (94% - 99%)  NST: pending  VE:0.5/50/-3  Sono: cephalic presentation                            12.2   14.70 )-----------( 215      ( 21 May 2021 06:37 )             37.6         36 y/o P0 at 34 weeks admitted for PPROM s/p Azithro, clinda, gent; for IOL today GBS negative  - Tx to L&D   - PO cytotec  - EFM/toco  - 2 units PRBC on hold    d/w Dr Toan Santiago [facility]      Patient seen and examined at bedside. Patient reports feeling well. All questions and concerns regarding IOL was discussed with patient and partner at bedside.   - Dr. Joya                        Lactate Trend            CAPILLARY BLOOD GLUCOSE            Culture Results:   <10,000 CFU/mL Normal Urogenital Samira (05-16 @ 13:03)  Culture Results:   No Beta Strep group B isolated (05-16 @ 12:45)

## 2021-05-21 NOTE — OB NEONATOLOGY/PEDIATRICIAN DELIVERY SUMMARY - NSPEDSNEONOTESA_OBGYN_ALL_OB_FT
Called to delivery for prematurity. Mother admitted for PPROM. Baby boy born at 34wks via  to a 34y/o  O+ blood type mother. Maternal history of fibriods. Prenatal history unremarkable, mother received beta and Mg PTD. PNL nr/immune/-, GBS - on . ROM at 830 on  with clear fluids. Mother received clindamycin and gentamicin. Baby emerged vigorous, crying, was w/d/s/s with APGARS of 7/8. CPAP started at 3MOL for poor sats<70, given 5/21% initially increased to 6/50%. Leaving for NICU CPAP 6/35%. Temperature was 36.7 Celsius. Mom would like to breast and bottle feed, consents Hep B and declines circ. EOS 1.65. Mother COVID status negative.

## 2021-05-22 LAB
BASOPHILS # BLD AUTO: 0.05 K/UL — SIGNIFICANT CHANGE UP (ref 0–0.2)
BASOPHILS NFR BLD AUTO: 0.2 % — SIGNIFICANT CHANGE UP (ref 0–2)
EOSINOPHIL # BLD AUTO: 0.06 K/UL — SIGNIFICANT CHANGE UP (ref 0–0.5)
EOSINOPHIL NFR BLD AUTO: 0.3 % — SIGNIFICANT CHANGE UP (ref 0–6)
HCT VFR BLD CALC: 31.2 % — LOW (ref 34.5–45)
HGB BLD-MCNC: 10.4 G/DL — LOW (ref 11.5–15.5)
IANC: 16.64 K/UL — HIGH (ref 1.5–8.5)
IMM GRANULOCYTES NFR BLD AUTO: 3.7 % — HIGH (ref 0–1.5)
LYMPHOCYTES # BLD AUTO: 1.55 K/UL — SIGNIFICANT CHANGE UP (ref 1–3.3)
LYMPHOCYTES # BLD AUTO: 7.3 % — LOW (ref 13–44)
MCHC RBC-ENTMCNC: 28.2 PG — SIGNIFICANT CHANGE UP (ref 27–34)
MCHC RBC-ENTMCNC: 33.3 GM/DL — SIGNIFICANT CHANGE UP (ref 32–36)
MCV RBC AUTO: 84.6 FL — SIGNIFICANT CHANGE UP (ref 80–100)
MONOCYTES # BLD AUTO: 2.03 K/UL — HIGH (ref 0–0.9)
MONOCYTES NFR BLD AUTO: 9.6 % — SIGNIFICANT CHANGE UP (ref 2–14)
NEUTROPHILS # BLD AUTO: 16.64 K/UL — HIGH (ref 1.8–7.4)
NEUTROPHILS NFR BLD AUTO: 78.9 % — HIGH (ref 43–77)
NRBC # BLD: 0 /100 WBCS — SIGNIFICANT CHANGE UP
NRBC # FLD: 0 K/UL — SIGNIFICANT CHANGE UP
PLATELET # BLD AUTO: 190 K/UL — SIGNIFICANT CHANGE UP (ref 150–400)
RBC # BLD: 3.69 M/UL — LOW (ref 3.8–5.2)
RBC # FLD: 13.5 % — SIGNIFICANT CHANGE UP (ref 10.3–14.5)
WBC # BLD: 21.12 K/UL — HIGH (ref 3.8–10.5)
WBC # FLD AUTO: 21.12 K/UL — HIGH (ref 3.8–10.5)

## 2021-05-22 RX ORDER — DIPHENHYDRAMINE HCL 50 MG
25 CAPSULE ORAL EVERY 6 HOURS
Refills: 0 | Status: DISCONTINUED | OUTPATIENT
Start: 2021-05-22 | End: 2021-05-23

## 2021-05-22 RX ORDER — IBUPROFEN 200 MG
600 TABLET ORAL EVERY 6 HOURS
Refills: 0 | Status: COMPLETED | OUTPATIENT
Start: 2021-05-22 | End: 2022-04-20

## 2021-05-22 RX ORDER — SODIUM CHLORIDE 9 MG/ML
1000 INJECTION, SOLUTION INTRAVENOUS ONCE
Refills: 0 | Status: COMPLETED | OUTPATIENT
Start: 2021-05-22 | End: 2021-05-22

## 2021-05-22 RX ORDER — MAGNESIUM HYDROXIDE 400 MG/1
30 TABLET, CHEWABLE ORAL
Refills: 0 | Status: DISCONTINUED | OUTPATIENT
Start: 2021-05-22 | End: 2021-05-23

## 2021-05-22 RX ORDER — BENZOCAINE 10 %
1 GEL (GRAM) MUCOUS MEMBRANE EVERY 6 HOURS
Refills: 0 | Status: DISCONTINUED | OUTPATIENT
Start: 2021-05-22 | End: 2021-05-23

## 2021-05-22 RX ORDER — ACETAMINOPHEN 500 MG
975 TABLET ORAL
Refills: 0 | Status: DISCONTINUED | OUTPATIENT
Start: 2021-05-22 | End: 2021-05-23

## 2021-05-22 RX ORDER — SIMETHICONE 80 MG/1
80 TABLET, CHEWABLE ORAL EVERY 4 HOURS
Refills: 0 | Status: DISCONTINUED | OUTPATIENT
Start: 2021-05-22 | End: 2021-05-23

## 2021-05-22 RX ORDER — KETOROLAC TROMETHAMINE 30 MG/ML
30 SYRINGE (ML) INJECTION ONCE
Refills: 0 | Status: DISCONTINUED | OUTPATIENT
Start: 2021-05-22 | End: 2021-05-22

## 2021-05-22 RX ORDER — PRAMOXINE HYDROCHLORIDE 150 MG/15G
1 AEROSOL, FOAM RECTAL EVERY 4 HOURS
Refills: 0 | Status: DISCONTINUED | OUTPATIENT
Start: 2021-05-22 | End: 2021-05-23

## 2021-05-22 RX ORDER — OXYTOCIN 10 UNIT/ML
333.33 VIAL (ML) INJECTION
Qty: 20 | Refills: 0 | Status: DISCONTINUED | OUTPATIENT
Start: 2021-05-22 | End: 2021-05-22

## 2021-05-22 RX ORDER — AER TRAVELER 0.5 G/1
1 SOLUTION RECTAL; TOPICAL EVERY 4 HOURS
Refills: 0 | Status: DISCONTINUED | OUTPATIENT
Start: 2021-05-22 | End: 2021-05-23

## 2021-05-22 RX ORDER — OXYCODONE HYDROCHLORIDE 5 MG/1
5 TABLET ORAL ONCE
Refills: 0 | Status: DISCONTINUED | OUTPATIENT
Start: 2021-05-22 | End: 2021-05-23

## 2021-05-22 RX ORDER — IBUPROFEN 200 MG
600 TABLET ORAL EVERY 6 HOURS
Refills: 0 | Status: DISCONTINUED | OUTPATIENT
Start: 2021-05-22 | End: 2021-05-23

## 2021-05-22 RX ORDER — OXYCODONE HYDROCHLORIDE 5 MG/1
5 TABLET ORAL
Refills: 0 | Status: DISCONTINUED | OUTPATIENT
Start: 2021-05-22 | End: 2021-05-23

## 2021-05-22 RX ORDER — SODIUM CHLORIDE 9 MG/ML
3 INJECTION INTRAMUSCULAR; INTRAVENOUS; SUBCUTANEOUS EVERY 8 HOURS
Refills: 0 | Status: DISCONTINUED | OUTPATIENT
Start: 2021-05-22 | End: 2021-05-23

## 2021-05-22 RX ORDER — DIBUCAINE 1 %
1 OINTMENT (GRAM) RECTAL EVERY 6 HOURS
Refills: 0 | Status: DISCONTINUED | OUTPATIENT
Start: 2021-05-22 | End: 2021-05-23

## 2021-05-22 RX ORDER — HYDROCORTISONE 1 %
1 OINTMENT (GRAM) TOPICAL EVERY 6 HOURS
Refills: 0 | Status: DISCONTINUED | OUTPATIENT
Start: 2021-05-22 | End: 2021-05-23

## 2021-05-22 RX ORDER — LANOLIN
1 OINTMENT (GRAM) TOPICAL EVERY 6 HOURS
Refills: 0 | Status: DISCONTINUED | OUTPATIENT
Start: 2021-05-22 | End: 2021-05-23

## 2021-05-22 RX ORDER — ONDANSETRON 8 MG/1
4 TABLET, FILM COATED ORAL ONCE
Refills: 0 | Status: COMPLETED | OUTPATIENT
Start: 2021-05-22 | End: 2021-05-22

## 2021-05-22 RX ORDER — SENNA PLUS 8.6 MG/1
1 TABLET ORAL
Refills: 0 | Status: DISCONTINUED | OUTPATIENT
Start: 2021-05-22 | End: 2021-05-23

## 2021-05-22 RX ORDER — TETANUS TOXOID, REDUCED DIPHTHERIA TOXOID AND ACELLULAR PERTUSSIS VACCINE, ADSORBED 5; 2.5; 8; 8; 2.5 [IU]/.5ML; [IU]/.5ML; UG/.5ML; UG/.5ML; UG/.5ML
0.5 SUSPENSION INTRAMUSCULAR ONCE
Refills: 0 | Status: DISCONTINUED | OUTPATIENT
Start: 2021-05-22 | End: 2021-05-23

## 2021-05-22 RX ORDER — ERTAPENEM SODIUM 1 G/1
1000 INJECTION, POWDER, LYOPHILIZED, FOR SOLUTION INTRAMUSCULAR; INTRAVENOUS ONCE
Refills: 0 | Status: COMPLETED | OUTPATIENT
Start: 2021-05-22 | End: 2021-05-22

## 2021-05-22 RX ADMIN — Medication 1 TABLET(S): at 11:46

## 2021-05-22 RX ADMIN — SODIUM CHLORIDE 3 MILLILITER(S): 9 INJECTION INTRAMUSCULAR; INTRAVENOUS; SUBCUTANEOUS at 14:18

## 2021-05-22 RX ADMIN — Medication 30 MILLIGRAM(S): at 06:37

## 2021-05-22 RX ADMIN — Medication 30 MILLIGRAM(S): at 06:58

## 2021-05-22 RX ADMIN — ONDANSETRON 4 MILLIGRAM(S): 8 TABLET, FILM COATED ORAL at 06:37

## 2021-05-22 RX ADMIN — Medication 1000 MILLIUNIT(S)/MIN: at 00:24

## 2021-05-22 RX ADMIN — ERTAPENEM SODIUM 120 MILLIGRAM(S): 1 INJECTION, POWDER, LYOPHILIZED, FOR SOLUTION INTRAMUSCULAR; INTRAVENOUS at 00:55

## 2021-05-22 RX ADMIN — Medication 975 MILLIGRAM(S): at 23:40

## 2021-05-22 RX ADMIN — Medication 975 MILLIGRAM(S): at 23:00

## 2021-05-22 RX ADMIN — SODIUM CHLORIDE 1000 MILLILITER(S): 9 INJECTION, SOLUTION INTRAVENOUS at 00:10

## 2021-05-22 RX ADMIN — Medication 975 MILLIGRAM(S): at 14:49

## 2021-05-22 RX ADMIN — Medication 975 MILLIGRAM(S): at 15:27

## 2021-05-22 RX ADMIN — SODIUM CHLORIDE 3 MILLILITER(S): 9 INJECTION INTRAMUSCULAR; INTRAVENOUS; SUBCUTANEOUS at 06:36

## 2021-05-22 NOTE — PROVIDER CONTACT NOTE (OTHER) - ACTION/TREATMENT ORDERED:
Provider notified, patient seen at bedside with Leilani NP and given further education. Zofran ordered to be administered

## 2021-05-22 NOTE — DISCHARGE NOTE OB - HOSPITAL COURSE
PPROm at 33wks  IOL at 34wks  Fibroid uterus    Male  manual placenta extraction  Cervical laceration

## 2021-05-22 NOTE — OB PROVIDER DELIVERY SUMMARY - AS DELIV COMPLICATIONS OB
abnormal fetal heart rate tracing/nuchal cord abnormal fetal heart rate tracing/nuchal cord/prolonged rupture of membranes/premature rupture of membranes prior to labor/cervical laceration

## 2021-05-22 NOTE — OB PROVIDER DELIVERY SUMMARY - NSPROVIDERDELIVERYNOTE_OBGYN_ALL_OB_FT
Cord avulsed during placental delivery  Placenta manually removed under ultrasound guidance  Fundus firm with bimanual massage and pitocin 20U IV  Exam revealed intact sulci, and vaginal walls.  3-4cm L cervical laceration repaired with 2-0 chromic  1-2cm R cervical laceration repaired with 2-0 chromic  Rectal exam confirmed intact anal sphincter and rectal mucosa  Perineal body reinforced with interrupted sutures of vicryl in figure-of 8  2nd degree perineal laceration repaired with 2-0 chromic  Excellent hemostasis noted  Mother and baby resting comfortably   Ultrasound confirmed 4mm uterine stripe without flow  of liveborn male  in INDIGO position over intact perineum to sterile field. Nuchal x 2, reduced. Right anterior shoulder easily delivered. Delayed cord clamping x 30sec.   Spontaneous cry, APGARS 7/8, weight 5#2oz.   Placenta at introitus but cord avulsed during placental delivery, obstructed by large right fundal and TERESA myomas  Placenta manually removed under ultrasound guidance  Fundus firm with bimanual massage and pitocin 20U IV  Exam revealed intact sulci, and vaginal walls.  3-4cm L cervical laceration repaired with 2-0 chromic  1-2cm R cervical laceration repaired with 2-0 chromic  Rectal exam confirmed intact anal sphincter and rectal mucosa  Perineal body reinforced with interrupted sutures of vicryl in figure-of 8  2nd degree perineal laceration repaired with 2-0 chromic  Excellent hemostasis noted  Mother and baby resting comfortably   Ultrasound confirmed 4mm uterine stripe without flow

## 2021-05-22 NOTE — DISCHARGE NOTE OB - CARE PROVIDER_API CALL
Estefania Hutchison)  [address], [address] [place] [address]  Phone: [phone]  Fax: [phone]  Follow Up Time:

## 2021-05-22 NOTE — DISCHARGE NOTE OB - PATIENT PORTAL LINK FT
You can access the FollowMyHealth Patient Portal offered by [facility] by registering at the following website: [email or web address]. By joining [facility]’s FollowMyHealth portal, you will also be able to view your health information using other applications (apps) compatible with our system.

## 2021-05-22 NOTE — DISCHARGE NOTE OB - CARE PLAN
Principal Discharge DX:	 (spontaneous vaginal delivery)  Goal:	Routine recovery  Assessment and plan of treatment:	Routine Postpartum Care

## 2021-05-23 VITALS
SYSTOLIC BLOOD PRESSURE: 103 MMHG | RESPIRATION RATE: 18 BRPM | DIASTOLIC BLOOD PRESSURE: 60 MMHG | TEMPERATURE: 98 F | OXYGEN SATURATION: 100 % | HEART RATE: 85 BPM

## 2021-05-23 RX ADMIN — SODIUM CHLORIDE 3 MILLILITER(S): 9 INJECTION INTRAMUSCULAR; INTRAVENOUS; SUBCUTANEOUS at 06:39

## 2021-05-23 RX ADMIN — Medication 975 MILLIGRAM(S): at 08:50

## 2021-05-23 RX ADMIN — Medication 975 MILLIGRAM(S): at 09:30

## 2021-05-23 NOTE — PROGRESS NOTE ADULT - SUBJECTIVE AND OBJECTIVE BOX
Post-partum Note,   She is a  35y woman who is now post-partum day: 1    Subjective:  The patient feels well.  She is ambulating.   She is tolerating regular diet.  She denies nausea and vomiting; denies fever.  She is voiding.  Her pain is controlled.  She reports normal postpartum bleeding.  She is breastfeeding.  She is formula feeding.    Physical exam:    Vital Signs Last 24 Hrs  T(C): 36.8 (22 May 2021 09:21), Max: 37.1 (22 May 2021 03:28)  T(F): 98.2 (22 May 2021 09:21), Max: 98.8 (22 May 2021 05:59)  HR: 100 (22 May 2021 09:21) (73 - 144)  BP: 105/66 (22 May 2021 09:21) (79/47 - 132/82)  BP(mean): --  RR: 18 (22 May 2021 09:21) (16 - 18)  SpO2: 99% (22 May 2021 09:21) (83% - 100%)    Gen: NAD  Breast: Soft, nontender, not engorged.  Abdomen: Soft, nontender, no distension , firm uterine fundus at umbilicus.  Pelvic: Normal lochia noted  Ext: No calf tenderness    LABS:                        10.4   21.12 )-----------( 190      ( 22 May 2021 06:31 )             31.2       Rubella status:     Allergies    penicillin (Other)    Intolerances      MEDICATIONS  (STANDING):  acetaminophen   Tablet .. 975 milliGRAM(s) Oral <User Schedule>  diphtheria/tetanus/pertussis (acellular) Vaccine (ADAcel) 0.5 milliLiter(s) IntraMuscular once  ibuprofen  Tablet. 600 milliGRAM(s) Oral every 6 hours  prenatal multivitamin 1 Tablet(s) Oral daily  sodium chloride 0.9% lock flush 3 milliLiter(s) IV Push every 8 hours    MEDICATIONS  (PRN):  benzocaine 20%/menthol 0.5% Spray 1 Spray(s) Topical every 6 hours PRN for Perineal discomfort  dibucaine 1% Ointment 1 Application(s) Topical every 6 hours PRN Perineal discomfort  diphenhydrAMINE 25 milliGRAM(s) Oral every 6 hours PRN Pruritus  hydrocortisone 1% Cream 1 Application(s) Topical every 6 hours PRN Moderate Pain (4-6)  lanolin Ointment 1 Application(s) Topical every 6 hours PRN nipple soreness  magnesium hydroxide Suspension 30 milliLiter(s) Oral two times a day PRN Constipation  oxyCODONE    IR 5 milliGRAM(s) Oral every 3 hours PRN Moderate to Severe Pain (4-10)  oxyCODONE    IR 5 milliGRAM(s) Oral once PRN Moderate to Severe Pain (4-10)  pramoxine 1%/zinc 5% Cream 1 Application(s) Topical every 4 hours PRN Moderate Pain (4-6)  senna 1 Tablet(s) Oral two times a day PRN Constipation  simethicone 80 milliGRAM(s) Chew every 4 hours PRN Gas  witch hazel Pads 1 Application(s) Topical every 4 hours PRN Perineal discomfort        Assessment and Plan  PPD #1 s/p  w/ repair of bilateral cervical lacs and 2nd degree repair, manual placenta removal,  ml. Meeting PP milestones. Baby boy in NICU for prematurity, delivery at 34 weeks  S/p Invanz for manual removal of placenta, patient is afebrile.  AM CBC reviewed - Hgb 10.4   Doing well.  Encourage ambulation.  Encourage PO pain medication as needed   Pt to visit baby in NICU today  Lactation consult PRN, encourage pumping/breastfeeding  PP & PPD Instructions reviewed.  CPC.  D/C home tomorrow.        
R3 Antepartum Note, HD#2    Interval events: none    Patient seen and examined at bedside, no acute overnight events. No acute complaints. Pt reports +FM, denies LOF, VB, ctx, HA, epigastric pain, blurred vision, CP, SOB, N/V, fevers, and chills.    Vital Signs Last 24 Hours  T(C): 36.8 (05-17-21 @ 05:29), Max: 37.0 (05-16-21 @ 09:52)  HR: 95 (05-17-21 @ 05:29) (83 - 117)  BP: 85/50 (05-17-21 @ 05:29) (85/50 - 121/79)  RR: 18 (05-17-21 @ 05:29) (14 - 18)  SpO2: 95% (05-17-21 @ 05:29) (94% - 100%)    CAPILLARY BLOOD GLUCOSE          Physical Exam:  General: NAD  Abdomen: Soft, non-tender, gravid  Ext: No pain or swelling    NST reactive overnight    Labs:             12.0   9.57  )-----------( 177      ( 05-16 @ 12:15 )             37.2                   MEDICATIONS  (STANDING):  clindamycin   Capsule 300 milliGRAM(s) Oral every 8 hours  clindamycin IVPB      clindamycin IVPB 900 milliGRAM(s) IV Intermittent once  clindamycin IVPB 900 milliGRAM(s) IV Intermittent every 8 hours  gentamicin   IVPB 260 milliGRAM(s) IV Intermittent every 24 hours  heparin   Injectable 5000 Unit(s) SubCutaneous every 12 hours  prenatal multivitamin 1 Tablet(s) Oral daily    MEDICATIONS  (PRN):  betamethasone Injectable 12 milliGRAM(s) IntraMuscular every 24 hours PRN give 24h after first dose  
R3 Antepartum Note, HD#3    Interval events: none    Patient seen and examined at bedside, no acute overnight events. No acute complaints. Pt reports +FM, denies LOF, VB, ctx, HA, epigastric pain, blurred vision, CP, SOB, N/V, fevers, and chills.    Vital Signs Last 24 Hours  T(C): 36.8 (05-18-21 @ 06:22), Max: 37.2 (05-17-21 @ 17:37)  HR: 93 (05-18-21 @ 06:23) (88 - 114)  BP: 89/52 (05-18-21 @ 06:23) (79/47 - 97/55)  RR: 15 (05-18-21 @ 06:22) (14 - 17)  SpO2: 97% (05-18-21 @ 06:22) (94% - 98%)    CAPILLARY BLOOD GLUCOSE          Physical Exam:  General: NAD  Abdomen: Soft, non-tender, gravid  Ext: No pain or swelling    NST reactive overnight    Labs:             12.0   9.57  )-----------( 177      ( 05-16 @ 12:15 )             37.2                   MEDICATIONS  (STANDING):  clindamycin   Capsule 300 milliGRAM(s) Oral every 8 hours  clindamycin IVPB      clindamycin IVPB 900 milliGRAM(s) IV Intermittent once  clindamycin IVPB 900 milliGRAM(s) IV Intermittent every 8 hours  heparin   Injectable 5000 Unit(s) SubCutaneous every 12 hours  prenatal multivitamin 1 Tablet(s) Oral daily    MEDICATIONS  (PRN):  
Post-partum Note,   She is a  35y woman who is now post-partum day: 2    Subjective:  The patient feels well.  She is ambulating.   She is tolerating regular diet.  She denies nausea and vomiting; denies fever.  She is voiding.  Her pain is controlled.  She reports normal postpartum bleeding.  She is breastfeeding.  She is formula feeding.    Physical exam:    Vital Signs Last 24 Hrs  T(C): 36.8 (23 May 2021 05:47), Max: 36.8 (22 May 2021 17:56)  T(F): 98.2 (23 May 2021 05:47), Max: 98.3 (22 May 2021 17:56)  HR: 85 (23 May 2021 05:47) (85 - 102)  BP: 103/60 (23 May 2021 05:47) (103/60 - 106/73)  BP(mean): --  RR: 18 (23 May 2021 05:47) (18 - 18)  SpO2: 100% (23 May 2021 05:47) (99% - 100%)    Gen: NAD  Breast: Soft, nontender, not engorged.  Abdomen: Soft, nontender, no distension , firm uterine fundus at umbilicus.  Pelvic: Normal lochia noted  Ext: No calf tenderness    LABS:                        10.4   21.12 )-----------( 190      ( 22 May 2021 06:31 )             31.2       Rubella status:     Allergies    penicillin (Other)    Intolerances      MEDICATIONS  (STANDING):  acetaminophen   Tablet .. 975 milliGRAM(s) Oral <User Schedule>  diphtheria/tetanus/pertussis (acellular) Vaccine (ADAcel) 0.5 milliLiter(s) IntraMuscular once  ibuprofen  Tablet. 600 milliGRAM(s) Oral every 6 hours  prenatal multivitamin 1 Tablet(s) Oral daily  sodium chloride 0.9% lock flush 3 milliLiter(s) IV Push every 8 hours    MEDICATIONS  (PRN):  benzocaine 20%/menthol 0.5% Spray 1 Spray(s) Topical every 6 hours PRN for Perineal discomfort  dibucaine 1% Ointment 1 Application(s) Topical every 6 hours PRN Perineal discomfort  diphenhydrAMINE 25 milliGRAM(s) Oral every 6 hours PRN Pruritus  hydrocortisone 1% Cream 1 Application(s) Topical every 6 hours PRN Moderate Pain (4-6)  lanolin Ointment 1 Application(s) Topical every 6 hours PRN nipple soreness  magnesium hydroxide Suspension 30 milliLiter(s) Oral two times a day PRN Constipation  oxyCODONE    IR 5 milliGRAM(s) Oral every 3 hours PRN Moderate to Severe Pain (4-10)  oxyCODONE    IR 5 milliGRAM(s) Oral once PRN Moderate to Severe Pain (4-10)  pramoxine 1%/zinc 5% Cream 1 Application(s) Topical every 4 hours PRN Moderate Pain (4-6)  senna 1 Tablet(s) Oral two times a day PRN Constipation  simethicone 80 milliGRAM(s) Chew every 4 hours PRN Gas  witch hazel Pads 1 Application(s) Topical every 4 hours PRN Perineal discomfort        Assessment and Plan    PPD#2 s/p  at 34 weeks for PPROM complicated by bilateral cervical lacs, 2nd degree repaire, manual removal of placenta. EBL 400mL.  s/p Invanz for manual removal of placenta  Doing well  Continue to pump - baby boy in NICU  D/c Today    
R3 Antepartum Note, HD#5    Interval events: none    Patient seen and examined at bedside, no acute overnight events. No acute complaints. Pt reports +FM, denies LOF, VB, ctx, HA, epigastric pain, blurred vision, CP, SOB, N/V, fevers, and chills.    Vital Signs Last 24 Hours  T(C): 36.6 (05-20-21 @ 05:28), Max: 37.1 (05-19-21 @ 18:00)  HR: 75 (05-20-21 @ 05:28) (75 - 104)  BP: 90/54 (05-20-21 @ 05:28) (85/52 - 112/67)  RR: 17 (05-20-21 @ 05:28) (16 - 18)  SpO2: 98% (05-20-21 @ 05:28) (95% - 99%)    CAPILLARY BLOOD GLUCOSE          Physical Exam:  General: NAD  Abdomen: Soft, non-tender, gravid  Ext: No pain or swelling    NST reactive overnight    Labs:                MEDICATIONS  (STANDING):  clindamycin   Capsule 300 milliGRAM(s) Oral every 8 hours  clindamycin IVPB      clindamycin IVPB 900 milliGRAM(s) IV Intermittent once  heparin   Injectable 5000 Unit(s) SubCutaneous every 12 hours  prenatal multivitamin 1 Tablet(s) Oral daily    MEDICATIONS  (PRN):  
R3 Antepartum Note, HD#4    Interval events: none    Patient seen and examined at bedside, no acute overnight events. No acute complaints. Pt reports +FM, denies LOF, VB, ctx, HA, epigastric pain, blurred vision, CP, SOB, N/V, fevers, and chills.    Vital Signs Last 24 Hours  T(C): 36.9 (05-19-21 @ 05:46), Max: 36.9 (05-18-21 @ 10:10)  HR: 90 (05-19-21 @ 05:46) (84 - 108)  BP: 88/53 (05-19-21 @ 05:46) (88/53 - 107/63)  RR: 16 (05-19-21 @ 05:46) (16 - 18)  SpO2: 96% (05-19-21 @ 05:46) (92% - 98%)    CAPILLARY BLOOD GLUCOSE          Physical Exam:  General: NAD  Abdomen: Soft, non-tender, gravid  Ext: No pain or swelling    NST reactive overnight    Labs:                MEDICATIONS  (STANDING):  clindamycin   Capsule 300 milliGRAM(s) Oral every 8 hours  clindamycin IVPB      clindamycin IVPB 900 milliGRAM(s) IV Intermittent once  heparin   Injectable 5000 Unit(s) SubCutaneous every 12 hours  prenatal multivitamin 1 Tablet(s) Oral daily    MEDICATIONS  (PRN):

## 2021-12-21 NOTE — OB RN TRIAGE NOTE - NS_SISCREENINGSR_GEN_ALL_ED
Quality 110: Preventive Care And Screening: Influenza Immunization: Influenza Immunization Administered during Influenza season Detail Level: Detailed Negative

## 2022-06-27 NOTE — OB RN PATIENT PROFILE - NS_FINALEDD_OBGYN_ALL_OB_DT
Abnormal lab result.  Your cholesterol is worsening  Please take your meds as ordered and adhere strictly to your diet in order to avoid complications of high cholesterol which includes heart attack, stroke, kidney failure requiring dialysis, peripheral vascular disease, loss of limb and premature death.  Also, Maintain/Continue a heathy lifestyle.  Choose a diet low in red meat and avoid all fried foods and saturated fat in sweets.  Be more active. If you are able, walk for 35 mins 5 times a week.        As always, we are here to help youi achieve your health goals but we cannot do it without your cooperation. Let us know how we can help you.  
02-Jul-2021

## 2023-04-07 NOTE — OB RN PATIENT PROFILE - PSH
Independent RYAN Visit. HPI:  4/6/23, Time: 8:24 PM EDT         Jonathan Laurent is a 29 y.o. female presenting to the ED for vaginal bleeding, beginning 1 week ago. The complaint has been persistent, moderate in severity, and worsened by nothing. Patient reports that she is approximately 8 weeks pregnant she started having vaginal bleeding. Was seen several times in the emergency department and diagnosed with a miscarriage. Reports that she did see her OB/GYN today who recommended emergency department evaluation and likely D&C today. Patient reports pelvic cramping as well as vaginal bleeding still. Denies any chest pain, shortness of breath, lightheadedness, dizziness or weakness. Afebrile without recent travel or sick contacts. Patient denies all other symptoms at this time. OBGYN: Dr. Rigo Rojas    Review of Systems:   A complete review of systems was performed and pertinent positives and negatives are stated within HPI, all other systems reviewed and are negative.          --------------------------------------------- PAST HISTORY ---------------------------------------------  Past Medical History:  has a past medical history of Abnormal Pap smear, JERAMY (acute kidney injury) ([facility] [address] Cannabis abuse, Cocaine abuse ([facility] [address] Complication of anesthesia, Herpes simplex without mention of complication, Hypertensive urgency, Methamphetamine use ([facility] [address] Recurrent pregnancy loss, currently pregnant, and Suspected damage to fetus from maternal drug use. Past Surgical History:  has a past surgical history that includes IR BIOPSY KIDNEY PERCUTANEOUS (3/29/2021). Social History:  reports that she has been smoking cigarettes. She has a 1.00 pack-year smoking history. She has never used smokeless tobacco. She reports that she does not currently use alcohol. She reports that she does not currently use drugs after having used the following drugs: Marijuana (Weed) and Cocaine.     Family History: family history includes
No significant past surgical history

## 2024-01-26 NOTE — OB PROVIDER TRIAGE NOTE - PROCEDURE 1
Amiodarone Monitoring    Thyroid and Liver Function  You will have a blood draw approximately every 6 months for monitoring of thyroid and liver function. Labs ordered: ALT, AST, TSH.    Lung Function  A chest X-ray will be completed approximately once yearly.    A pulmonary function test (PFT) will be completed at baseline for monitoring of your diffusion capacity/overall lung function and repeated on an as needed basis pending development of any concerning symptoms such as shortness of breath, breathing difficulty.     Eye Exam  It is recommended to have yearly eye exams. Please inform your optometrist that you are on Amiodarone so that appropriate monitoring is completed. Your eye doctor may fax their results to our office at [phone] for our records.     Skin Care  Amiodarone can cause skin sensitivity when in the sun. Please utilize sun block when exposure is anticipated.        Subserous leiomyoma of uterus

## 2025-04-07 ENCOUNTER — EMERGENCY (EMERGENCY)
Facility: HOSPITAL | Age: 40
LOS: 1 days | End: 2025-04-07
Attending: STUDENT IN AN ORGANIZED HEALTH CARE EDUCATION/TRAINING PROGRAM | Admitting: EMERGENCY MEDICINE
Payer: COMMERCIAL

## 2025-04-07 VITALS
HEIGHT: 61 IN | DIASTOLIC BLOOD PRESSURE: 83 MMHG | TEMPERATURE: 97 F | OXYGEN SATURATION: 99 % | RESPIRATION RATE: 16 BRPM | SYSTOLIC BLOOD PRESSURE: 133 MMHG | WEIGHT: 117.95 LBS | HEART RATE: 89 BPM

## 2025-04-07 LAB
ALBUMIN SERPL ELPH-MCNC: 4.1 G/DL — SIGNIFICANT CHANGE UP (ref 3.3–5)
ALP SERPL-CCNC: 39 U/L — LOW (ref 40–120)
ALT FLD-CCNC: 15 U/L — SIGNIFICANT CHANGE UP (ref 12–78)
ANION GAP SERPL CALC-SCNC: 6 MMOL/L — SIGNIFICANT CHANGE UP (ref 5–17)
APPEARANCE UR: CLEAR — SIGNIFICANT CHANGE UP
AST SERPL-CCNC: 13 U/L — LOW (ref 15–37)
BASOPHILS # BLD AUTO: 0.07 K/UL — SIGNIFICANT CHANGE UP (ref 0–0.2)
BASOPHILS NFR BLD AUTO: 0.6 % — SIGNIFICANT CHANGE UP (ref 0–2)
BILIRUB SERPL-MCNC: 0.4 MG/DL — SIGNIFICANT CHANGE UP (ref 0.2–1.2)
BILIRUB UR-MCNC: NEGATIVE — SIGNIFICANT CHANGE UP
BUN SERPL-MCNC: 12 MG/DL — SIGNIFICANT CHANGE UP (ref 7–23)
CALCIUM SERPL-MCNC: 8.8 MG/DL — SIGNIFICANT CHANGE UP (ref 8.5–10.1)
CHLORIDE SERPL-SCNC: 109 MMOL/L — HIGH (ref 96–108)
CO2 SERPL-SCNC: 25 MMOL/L — SIGNIFICANT CHANGE UP (ref 22–31)
COLOR SPEC: YELLOW — SIGNIFICANT CHANGE UP
CREAT SERPL-MCNC: 0.63 MG/DL — SIGNIFICANT CHANGE UP (ref 0.5–1.3)
DIFF PNL FLD: ABNORMAL
EGFR: 116 ML/MIN/1.73M2 — SIGNIFICANT CHANGE UP
EGFR: 116 ML/MIN/1.73M2 — SIGNIFICANT CHANGE UP
EOSINOPHIL # BLD AUTO: 0.03 K/UL — SIGNIFICANT CHANGE UP (ref 0–0.5)
EOSINOPHIL NFR BLD AUTO: 0.3 % — SIGNIFICANT CHANGE UP (ref 0–6)
GLUCOSE SERPL-MCNC: 117 MG/DL — HIGH (ref 70–99)
GLUCOSE UR QL: NEGATIVE MG/DL — SIGNIFICANT CHANGE UP
HCG SERPL-ACNC: <1 MIU/ML — SIGNIFICANT CHANGE UP
HCT VFR BLD CALC: 37.3 % — SIGNIFICANT CHANGE UP (ref 34.5–45)
HGB BLD-MCNC: 12.8 G/DL — SIGNIFICANT CHANGE UP (ref 11.5–15.5)
IMM GRANULOCYTES NFR BLD AUTO: 0.5 % — SIGNIFICANT CHANGE UP (ref 0–0.9)
KETONES UR-MCNC: 15 MG/DL
LEUKOCYTE ESTERASE UR-ACNC: NEGATIVE — SIGNIFICANT CHANGE UP
LIDOCAIN IGE QN: 40 U/L — SIGNIFICANT CHANGE UP (ref 13–75)
LYMPHOCYTES # BLD AUTO: 1.28 K/UL — SIGNIFICANT CHANGE UP (ref 1–3.3)
LYMPHOCYTES # BLD AUTO: 11.8 % — LOW (ref 13–44)
MCHC RBC-ENTMCNC: 28.3 PG — SIGNIFICANT CHANGE UP (ref 27–34)
MCHC RBC-ENTMCNC: 34.3 G/DL — SIGNIFICANT CHANGE UP (ref 32–36)
MCV RBC AUTO: 82.3 FL — SIGNIFICANT CHANGE UP (ref 80–100)
MONOCYTES # BLD AUTO: 0.42 K/UL — SIGNIFICANT CHANGE UP (ref 0–0.9)
MONOCYTES NFR BLD AUTO: 3.9 % — SIGNIFICANT CHANGE UP (ref 2–14)
NEUTROPHILS # BLD AUTO: 9.01 K/UL — HIGH (ref 1.8–7.4)
NEUTROPHILS NFR BLD AUTO: 82.9 % — HIGH (ref 43–77)
NITRITE UR-MCNC: NEGATIVE — SIGNIFICANT CHANGE UP
NRBC BLD AUTO-RTO: 0 /100 WBCS — SIGNIFICANT CHANGE UP (ref 0–0)
PH UR: 5.5 — SIGNIFICANT CHANGE UP (ref 5–8)
PLATELET # BLD AUTO: 264 K/UL — SIGNIFICANT CHANGE UP (ref 150–400)
POTASSIUM SERPL-MCNC: 3.4 MMOL/L — LOW (ref 3.5–5.3)
POTASSIUM SERPL-SCNC: 3.4 MMOL/L — LOW (ref 3.5–5.3)
PROT SERPL-MCNC: 7.7 G/DL — SIGNIFICANT CHANGE UP (ref 6–8.3)
PROT UR-MCNC: NEGATIVE MG/DL — SIGNIFICANT CHANGE UP
RBC # BLD: 4.53 M/UL — SIGNIFICANT CHANGE UP (ref 3.8–5.2)
RBC # FLD: 12.1 % — SIGNIFICANT CHANGE UP (ref 10.3–14.5)
SODIUM SERPL-SCNC: 140 MMOL/L — SIGNIFICANT CHANGE UP (ref 135–145)
SP GR SPEC: 1.01 — SIGNIFICANT CHANGE UP (ref 1–1.03)
UROBILINOGEN FLD QL: 0.2 MG/DL — SIGNIFICANT CHANGE UP (ref 0.2–1)
WBC # BLD: 10.86 K/UL — HIGH (ref 3.8–10.5)
WBC # FLD AUTO: 10.86 K/UL — HIGH (ref 3.8–10.5)

## 2025-04-07 PROCEDURE — 80053 COMPREHEN METABOLIC PANEL: CPT

## 2025-04-07 PROCEDURE — 83690 ASSAY OF LIPASE: CPT

## 2025-04-07 PROCEDURE — 86900 BLOOD TYPING SEROLOGIC ABO: CPT

## 2025-04-07 PROCEDURE — 36415 COLL VENOUS BLD VENIPUNCTURE: CPT

## 2025-04-07 PROCEDURE — 85025 COMPLETE CBC W/AUTO DIFF WBC: CPT

## 2025-04-07 PROCEDURE — 81001 URINALYSIS AUTO W/SCOPE: CPT

## 2025-04-07 PROCEDURE — 76856 US EXAM PELVIC COMPLETE: CPT | Mod: 26

## 2025-04-07 PROCEDURE — 99284 EMERGENCY DEPT VISIT MOD MDM: CPT

## 2025-04-07 PROCEDURE — 99284 EMERGENCY DEPT VISIT MOD MDM: CPT | Mod: 25

## 2025-04-07 PROCEDURE — 86901 BLOOD TYPING SEROLOGIC RH(D): CPT

## 2025-04-07 PROCEDURE — 86850 RBC ANTIBODY SCREEN: CPT

## 2025-04-07 PROCEDURE — 84702 CHORIONIC GONADOTROPIN TEST: CPT

## 2025-04-07 PROCEDURE — 76856 US EXAM PELVIC COMPLETE: CPT

## 2025-04-07 RX ADMIN — Medication 1000 MILLILITER(S): at 22:48

## 2025-04-07 NOTE — ED PROVIDER NOTE - OBJECTIVE STATEMENT
38yo F no PMHx presents to ED c/o vaginal bleeding, lower abdominal cramping that started around 3pm today. States she saw small amount of blood when wiping after urinating in afternoon, then felt a "gush" and saw clot the size of a golf ball in toilet. States since then she has been having persistent vaginal bleeding and was replacing toilet paper very frequently as she did not have pads with her. No abdominal pain at this time. Reports bleeding increases when she sits or stands. Was told she had fibroids during her last pregnancy. States she had period last week that was light and only lasted 1.5 days but she did not think anything of it because last 3 periods have been brief and light. Denies fever, chills, dysuria, n/v/d, back pain, flank pain, cp, sob, lightheadedness, AC use.

## 2025-04-07 NOTE — ED PROVIDER NOTE - NSFOLLOWUPINSTRUCTIONS_ED_ALL_ED_FT
Follow up with your gynecologist as soon as possible.   Abnormal (Dysfunctional) Uterine Bleeding    WHAT YOU NEED TO KNOW:    Abnormal uterine bleeding (AUB) is uterine bleeding that is not usual for you. It may also be called dysfunctional uterine bleeding. You may have bleeding from your uterus at times other than your normal monthly period. Your monthly periods may last longer or shorter, and bleeding may be heavier or lighter than usual. AUB can be acute (lasting a short time) or chronic (lasting longer than 6 months).  Female Reproductive System    DISCHARGE INSTRUCTIONS:    Seek care immediately if:    You continue to bleed heavily, or you feel faint.    Call your doctor or gynecologist if:    You need to change your sanitary pad or tampon more than 1 time each hour.    Your medicine causes nausea, vomiting, or diarrhea.    You have questions or concerns about your condition or care.  Medicines: You may need any of the following:    Hormones help decrease bleeding by making your monthly periods more regular. Sometimes this medicine may be given as birth control pills.    Iron supplements may be given if your blood iron level decreases because of heavy bleeding. Iron may make you constipated. Ask your healthcare provider for ways to prevent or treat constipation. Iron may also make your bowel movements turn dark or black.    Take your medicine as directed. Contact your healthcare provider if you think your medicine is not helping or if you have side effects. Tell your provider if you are allergic to any medicine. Keep a list of the medicines, vitamins, and herbs you take. Include the amounts, and when and why you take them. Bring the list or the pill bottles to follow-up visits. Carry your medicine list with you in case of an emergency.  Self-care:    Apply heat on your lower abdomen to decrease pain and muscle spasms. Apply heat for 20 to 30 minutes every 2 hours for as many days as directed.    Include foods high in iron if needed. Examples of foods high in iron are leafy green vegetables, beef, pork, liver, eggs, and whole-grain breads and cereals.  Sources of Iron      Keep a diary of your menstrual cycles. Keep track of the number of tampons or pads you use each day.    Talk to your healthcare provider before you start a weight loss program. You may need to wait until the abnormal bleeding has stopped before you try to lose weight. The amount of iron in your blood should be normal before you lose weight. Ask your provider if weight loss will help your AUB. He or she can tell you what weight is healthy for you. He or she can help you create a safe weight loss plan, if needed.  Follow up with your doctor or gynecologist as directed: You may need to return in 4 to 6 months so your provider knows if the AUB has stopped. Bring the diary of your menstrual cycles to your follow-up visits. Write down your questions so you remember to ask them during your visits.

## 2025-04-07 NOTE — ED PROVIDER NOTE - PHYSICAL EXAMINATION
Gen: No acute distress, non toxic  HENT: NCAT, Mucous membranes moist  Eyes: pink conjunctivae, EOMI, PERRL  CV: RRR, nl s1/s2.  Resp: CTAB, normal rate and effort  GI: Abdomen soft, NT, ND. No rebound, no guarding  Neuro: A&O x 3, sensorimotor intact without deficits   MSK: Full ROM ext x 4  Skin: No rashes. intact and perfused.

## 2025-04-07 NOTE — ED PROVIDER NOTE - PATIENT PORTAL LINK FT
You can access the FollowMyHealth Patient Portal offered by [facility] by registering at the following website: [email or web address]. By joining [facility]’s FollowMyHealth portal, you will also be able to view your health information using other applications (apps) compatible with our system.

## 2025-04-07 NOTE — ED ADULT NURSE NOTE - OBJECTIVE STATEMENT
Patient presents with vaginal bleeding since 3pm yesterday Used 5 pads. Vaginal bleeding is bright red with clots. States she had two days of menstruation which that stopped 4/1-4/2. Endorses stress from new job orientation at that could possibly contribute to irregularity of menses this month. Endorses pelvic pain.

## 2025-04-07 NOTE — ED PROVIDER NOTE - PROGRESS NOTE DETAILS
pt reassessed. abdomen is soft and nontender.  hgb: stable.  spoke to patient regarding lab and ultrasound results and need for urgent follow up with gyn. patient states she has a private gyn and will be following up with her. patient is encouraged to return to the ED if persistent or worsening bleeding, especially if she is experiencing symptoms of anemia such as fatigue, dizziness, chest pain, shortness of breath, or any other concerns. patient states understanding.

## 2025-04-07 NOTE — ED PROVIDER NOTE - CLINICAL SUMMARY MEDICAL DECISION MAKING FREE TEXT BOX
Here complaining of heavy vaginal bleeding with clots, recently had short menstrual cycle. differential diagnosis inclusive of DUB, metrorrhagia, functional bleeding. check labs, US, re-evaluate.

## 2025-04-07 NOTE — ED PROVIDER NOTE - CARE PROVIDERS DIRECT ADDRESSES
[email or web address]

## 2025-04-07 NOTE — ED PROVIDER NOTE - CARE PROVIDER_API CALL
Stoney Butler.  Obstetrics and Gynecology  [address] [place] [id number]  Phone: [phone]  Fax: [phone]  Follow Up Time: Urgent

## 2025-04-07 NOTE — ED PROVIDER NOTE - ATTENDING APP SHARED VISIT CONTRIBUTION OF CARE
This was a shared visit with WENCESLAO. I reviewed and verified the documentation and independently performed the documented MDM. Patient seen and examined by myself.

## 2025-04-08 VITALS
SYSTOLIC BLOOD PRESSURE: 117 MMHG | RESPIRATION RATE: 16 BRPM | HEART RATE: 82 BPM | TEMPERATURE: 98 F | OXYGEN SATURATION: 100 % | DIASTOLIC BLOOD PRESSURE: 71 MMHG

## 2025-04-08 NOTE — ED ADULT NURSE REASSESSMENT NOTE - NS ED NURSE REASSESS COMMENT FT1
Pt to dc home and follow up out pt with GYN.  Pt educated on signs and symptoms of anemia.  Pt and  expressed understanding of dc instructions.